# Patient Record
Sex: FEMALE | Race: WHITE
[De-identification: names, ages, dates, MRNs, and addresses within clinical notes are randomized per-mention and may not be internally consistent; named-entity substitution may affect disease eponyms.]

---

## 2020-09-21 ENCOUNTER — HOSPITAL ENCOUNTER (OUTPATIENT)
Dept: HOSPITAL 35 - LAB | Age: 80
End: 2020-09-21
Attending: SPECIALIST
Payer: COMMERCIAL

## 2020-09-21 DIAGNOSIS — Z01.812: Primary | ICD-10-CM

## 2020-09-21 DIAGNOSIS — Z20.828: ICD-10-CM

## 2020-09-24 ENCOUNTER — HOSPITAL ENCOUNTER (OUTPATIENT)
Dept: HOSPITAL 35 - GI | Age: 80
Discharge: HOME | End: 2020-09-24
Attending: SPECIALIST
Payer: COMMERCIAL

## 2020-09-24 VITALS — WEIGHT: 178.99 LBS | HEIGHT: 64.02 IN | BODY MASS INDEX: 30.56 KG/M2

## 2020-09-24 DIAGNOSIS — D12.3: ICD-10-CM

## 2020-09-24 DIAGNOSIS — Z86.010: ICD-10-CM

## 2020-09-24 DIAGNOSIS — Z79.899: ICD-10-CM

## 2020-09-24 DIAGNOSIS — E78.5: ICD-10-CM

## 2020-09-24 DIAGNOSIS — Z79.82: ICD-10-CM

## 2020-09-24 DIAGNOSIS — K57.30: ICD-10-CM

## 2020-09-24 DIAGNOSIS — K21.9: ICD-10-CM

## 2020-09-24 DIAGNOSIS — D12.0: ICD-10-CM

## 2020-09-24 DIAGNOSIS — F32.9: ICD-10-CM

## 2020-09-24 DIAGNOSIS — I10: ICD-10-CM

## 2020-09-24 DIAGNOSIS — Z90.710: ICD-10-CM

## 2020-09-24 DIAGNOSIS — Z12.11: Primary | ICD-10-CM

## 2020-09-24 DIAGNOSIS — Z98.890: ICD-10-CM

## 2020-09-24 DIAGNOSIS — Z87.19: ICD-10-CM

## 2020-09-24 DIAGNOSIS — Z88.2: ICD-10-CM

## 2020-09-24 DIAGNOSIS — Z90.49: ICD-10-CM

## 2020-09-24 PROCEDURE — 62110: CPT

## 2020-09-24 PROCEDURE — 62900: CPT

## 2020-09-24 NOTE — P
[facility]
[address]
[place]   [address]                     PROCEDURE REPORT              
_______________________________________________________________________________
 
Name:       CASE,MAGY BUCKLEY              Room #:                     REG [facility] 
JONATHAN.#:      [id number]                       Account #:      [id number]  
Admission:  09/24/20    Attend Phys:    Tomer Viramontes MD   
Discharge:              Date of Birth:  [birth year]  
                                                          Report #: [id number]
                                                                    [id number]   
_______________________________________________________________________________
THIS REPORT FOR:  
 
cc:  Mikki Falcon MD, Michelle R. MD Thesing, John A. MD                                           ~
CC: Tomer Falcon MD
 
DATE OF SERVICE:  09/24/2020
 
 
OUTPATIENT COLONOSCOPY REPORT
 
BRIEF HISTORY:  The patient is a 80-year-old woman with a history of colon
adenomas, for high risk screening colonoscopy.
 
PREOPERATIVE DIAGNOSIS:  High risk screening colonoscopy.
 
POSTOPERATIVE DIAGNOSES:
1.  Diminutive polyps x 3.
2.  Moderate sigmoid diverticulosis coli.
 
MEDICATIONS:  Deep sedation with propofol per anesthesia.
 
SPECIMENS:
1.  Diminutive cecal polyp.
2.  Diminutive midtransverse colon polyp.
3.  Diminutive polyp at 20 cm.
 
ESTIMATED BLOOD LOSS:  3 mL.
 
PROCEDURE:  Colonoscopy to cecum and terminal ileum with biopsy.
 
FINDINGS:  Prior to propofol sedation, procedure of colonoscopy discussed with
the patient, as well as potential risks and its complications.  She indicates
she understands and desires to proceed.
 
DESCRIPTION OF PROCEDURE:  With the patient in the left lateral decubitus
position, digital examination was completed, which revealed no abnormalities. 
Subsequently, the Olympus video colonoscope was introduced into the rectum,
advanced under direct vision to the cecum.  Done with minimal difficulty.  The
cecum was identified by the ileocecal valve and the appendiceal orifice.  I was
able to visualize the distal segment of the terminal ileum, which was inspected
and noted to be unremarkable.  At that point, the scope was slowly withdrawn and
careful circumferential views were obtained.  Upon slow withdrawal of the scope,
 
 
 
[facility]
[address]
[place]   [address]                     PROCEDURE REPORT              
_______________________________________________________________________________
 
Name:       CASE,MAGY BUCKLEY              Room #:                     REG TRENTON MCDANIELS#:      [id number]                       Account #:      [id number]  
Admission:  09/24/20    Attend Phys:    Tomer Viramontes MD   
Discharge:              Date of Birth:  [birth year]  
                                                          Report #: [id number]
                                                                    [id number]   
_______________________________________________________________________________
the prep was good.  The mucosa was within normal limits, normal vascular
pattern, and normal light reflex.  As we withdrew the scope, she was found to
have a diminutive polyp in the cecum, which was removed with biopsy forceps. 
The scope was further withdrawn and in the transverse colon, another diminutive
polyp was seen and removed with biopsy forceps.  The mucosa was otherwise normal
as we withdrew the scope.  At the sigmoid colon, there was noted to be
moderately severe sigmoid diverticular disease without endoscopic evidence of
diverticulitis.  In addition, in the very distal sigmoid at 20 cm, another
diminutive polyp was seen and removed with biopsy forceps.  The scope was
withdrawn.  The patient tolerated the procedure well.
 
CONDITION OF THE PATIENT UPON DISCHARGE:  Following procedure, the patient was
drowsy, aroused, conversant, and will be discharged to home when fully
ambulatory.
 
INSTRUCTIONS TO THE PATIENT AND FAMILY AT THE TIME OF DISCHARGE:  We will follow
up on the pathology.  If all 3 are adenomas, I suggest she return in 3 years for
high risk screening colonoscopy.  If 1 or 2, consider 5 years, but I would base
that on her overall health status in 5 years from now.  If none are adenomas, no
further surveillance is likely to be benefitting to this patient.
 
Withdrawal time from the cecum was 10 minutes 50 seconds.  She will return to
the care of Dr. Mikki Falcon, return to see me as needed.
 
 
 
 
 
 
 
 
 
 
 
 
 
 
 
 
 
 
 
 
 
  <ELECTRONICALLY SIGNED>
   By: Tomer Viramontes MD           
  09/24/20     1128
D: 09/24/20 0843                           _____________________________________
T: 09/24/20 0918                           Tomer Viramontes MD             /nt

## 2020-09-25 NOTE — PATH
[facility]
[address]
[place]   [address]                     PATHOLOGY RPT PROCEDURE       
_______________________________________________________________________________
 
Name:       CASE,MAGY BUCKLEY              Room #:                     REG TRENTON CASTILLO.#:      [id number]     Account #:      [id number]  
Admission:  09/24/20    Date of Birth:  [birth year]  
Discharge:                                              Report #:    [id number]
                                                        Path Case #: [id number]
_______________________________________________________________________________
 
[facility] Accession Number: [id number]
.                                                                01
Material submitted:                                        .
PART A: cecum - POLYP AT CECUM
PART B: colon - POLYP AT TRANSVERSE COLON. Modifiers: transverse
PART C: colon - POLYP AT 20CM
.                                                                01
Clinical history:                                          .
HX OF POLYPS
.                                                                02
**********************************************************************
Diagnosis:
A.  Polyp, at cecum, endoscopic biopsy:
- Tubular adenoma.
- Negative for high-grade dysplasia.
.
B.  Polyp, at transverse colon, endoscopic biopsy:
- Tubular adenoma.
- Negative for high-grade dysplasia.
.
C.  Polyp, at 20 cm, endoscopic biopsy:
- Hyperplastic polyp.
- Negative for dysplasia.
(IUV:zuly; 09/25/2020)
QMS  09/25/2020  1330 Local
**********************************************************************
.                                                                02
Electronically signed:                                     .
Lor Nicolas MD, Pathologist
NPI- [id number]
.                                                                01
Gross description:                                         .
A.  The specimen is received in formalin, labeled "Magy Case, polyp at
cecum".  Received is a segment of pale tan soft tissue measuring 0.4 cm in
maximum dimensions.  The specimen is submitted entirely in cassette A1.
.
B.  The specimen is received in formalin, labeled "Magy Case, polyp at
transverse colon".  Received are three segments of pale tan soft tissue
ranging in size from 0.2 to 0.3 cm in maximum dimensions.  The specimen is
submitted entirely in cassette B1.
.
C.  The specimen is received in formalin, labeled "Magy Case, polyp at
20 cm".  Received is a segment of pale tan soft tissue measuring 0.3 cm in
maximum dimensions.  The specimen is submitted entirely in cassette C1.
(CAA; 9/24/2020)
QAC/QAC  09/24/2020  [address] [facility]
.                                                                02
 
 
[place] [address]                     PATHOLOGY RPT PROCEDURE       
_______________________________________________________________________________
 
Name:       CASEMAGY              Room #:                     REG TRENTON MUSE.RADHA.#:      [id number]     Account #:      [id number]  
Admission:  09/24/20    Date of Birth:  [birth year]  
Discharge:                                              Report #:    [id number]
                                                        Path Case #: [id number]
_______________________________________________________________________________
Pathologist provided ICD-10:
D12.0, D12.3, K63.5
.                                                                02
CPT                                                        .
221547, 512631, 672598
Specimen Comment: A courtesy copy of this report has been sent to [phone]
[phone]
Specimen Comment: [phone]
Specimen Comment: Report sent to  / DR STATON
***Performed at:  01
   [address], [place]  [id number]
   MD Venancio Diaz MD Phone:  [phone]
***Performed at:  02
   [address]  [id number]
   MD Lor Nicolas MD Phone:  [phone]